# Patient Record
Sex: MALE | Race: WHITE
[De-identification: names, ages, dates, MRNs, and addresses within clinical notes are randomized per-mention and may not be internally consistent; named-entity substitution may affect disease eponyms.]

---

## 2021-02-15 NOTE — CT
CT HEAD WITHOUT CONTRAST:

 

INDICATIONS:

Seizure. Mental status change.

 

COMPARISON:

None.

 

FINDINGS:

The ventricles are upper normal in size. There is no evidence of intracranial mass, hemorrhage, edema
 or infarct.

 

The paranasal sinuses and mastoids appear clear.

 

Mild cortical volume loss is seen, which is more prominent than expected for age.

 

IMPRESSION:

No evidence of acute abnormality.

 

POS: [facility]W

## 2021-04-13 ENCOUNTER — HOSPITAL ENCOUNTER (EMERGENCY)
Dept: HOSPITAL 92 - ERS | Age: 46
Discharge: HOME | End: 2021-04-13
Payer: OTHER GOVERNMENT

## 2021-04-13 DIAGNOSIS — F17.210: ICD-10-CM

## 2021-04-13 DIAGNOSIS — F10.239: Primary | ICD-10-CM

## 2021-04-13 PROCEDURE — 99284 EMERGENCY DEPT VISIT MOD MDM: CPT
